# Patient Record
Sex: MALE | Race: BLACK OR AFRICAN AMERICAN | NOT HISPANIC OR LATINO | URBAN - METROPOLITAN AREA
[De-identification: names, ages, dates, MRNs, and addresses within clinical notes are randomized per-mention and may not be internally consistent; named-entity substitution may affect disease eponyms.]

---

## 2024-03-08 ENCOUNTER — LAB (OUTPATIENT)
Dept: URBAN - METROPOLITAN AREA CLINIC 88 | Facility: CLINIC | Age: 54
End: 2024-03-08

## 2024-03-08 ENCOUNTER — OV NP (OUTPATIENT)
Dept: URBAN - METROPOLITAN AREA CLINIC 88 | Facility: CLINIC | Age: 54
End: 2024-03-08
Payer: MEDICARE

## 2024-03-08 VITALS
SYSTOLIC BLOOD PRESSURE: 104 MMHG | TEMPERATURE: 98 F | BODY MASS INDEX: 15.49 KG/M2 | HEIGHT: 72 IN | DIASTOLIC BLOOD PRESSURE: 69 MMHG | WEIGHT: 114.4 LBS | HEART RATE: 101 BPM | OXYGEN SATURATION: 96 %

## 2024-03-08 DIAGNOSIS — Z12.11 ENCOUNTER FOR SCREENING COLONOSCOPY: ICD-10-CM

## 2024-03-08 PROCEDURE — 99202 OFFICE O/P NEW SF 15 MIN: CPT | Performed by: NURSE PRACTITIONER

## 2024-03-08 PROCEDURE — 993 AGA: Performed by: NURSE PRACTITIONER

## 2024-03-08 RX ORDER — DOCUSATE SODIUM 100 MG/1
TAKE 1 CAPSULE BY MOUTH TWICE DAILY CAPSULE, LIQUID FILLED ORAL
Qty: 60 EACH | Refills: 0 | Status: ACTIVE | COMMUNITY

## 2024-03-08 RX ORDER — FLUTICASONE FUROATE AND VILANTEROL TRIFENATATE 100; 25 UG/1; UG/1
POWDER RESPIRATORY (INHALATION)
Qty: 60 UNSPECIFIED | Status: ACTIVE | COMMUNITY

## 2024-03-08 NOTE — HPI-TODAY'S VISIT:
53 y.o.presents today, along with his sister-in-law, to discuss screening colonoscopy.   Due to his mental disability his [employer] is providing information.  Denies family hx of colon cancer, prior colonoscopy, rectal bleeding, constipation or diarrhea.  Defecation occurs daily.

## 2024-03-15 ENCOUNTER — COLON (OUTPATIENT)
Dept: URBAN - METROPOLITAN AREA SURGERY CENTER 24 | Facility: SURGERY CENTER | Age: 54
End: 2024-03-15

## 2024-04-05 ENCOUNTER — COLON (OUTPATIENT)
Dept: URBAN - METROPOLITAN AREA SURGERY CENTER 24 | Facility: SURGERY CENTER | Age: 54
End: 2024-04-05